# Patient Record
Sex: FEMALE | Race: BLACK OR AFRICAN AMERICAN | Employment: UNEMPLOYED | URBAN - METROPOLITAN AREA
[De-identification: names, ages, dates, MRNs, and addresses within clinical notes are randomized per-mention and may not be internally consistent; named-entity substitution may affect disease eponyms.]

---

## 2017-07-12 PROBLEM — G44.209 TENSION TYPE HEADACHE: Status: ACTIVE | Noted: 2017-07-12

## 2019-01-31 ENCOUNTER — TELEPHONE (OUTPATIENT)
Dept: INTERNAL MEDICINE | Age: 29
End: 2019-01-31

## 2019-02-07 ENCOUNTER — TELEPHONE (OUTPATIENT)
Dept: ADMINISTRATIVE | Age: 29
End: 2019-02-07

## 2019-03-04 ENCOUNTER — TELEPHONE (OUTPATIENT)
Dept: INTERNAL MEDICINE | Age: 29
End: 2019-03-04

## 2019-03-11 ENCOUNTER — OFFICE VISIT (OUTPATIENT)
Dept: INTERNAL MEDICINE | Age: 29
End: 2019-03-11
Payer: COMMERCIAL

## 2019-03-11 VITALS
DIASTOLIC BLOOD PRESSURE: 83 MMHG | TEMPERATURE: 98.3 F | HEART RATE: 118 BPM | BODY MASS INDEX: 35.03 KG/M2 | RESPIRATION RATE: 16 BRPM | SYSTOLIC BLOOD PRESSURE: 143 MMHG | WEIGHT: 218 LBS | HEIGHT: 66 IN

## 2019-03-11 DIAGNOSIS — G56.03 CARPAL TUNNEL SYNDROME, BILATERAL UPPER LIMBS: Primary | ICD-10-CM

## 2019-03-11 DIAGNOSIS — I10 ESSENTIAL HYPERTENSION: ICD-10-CM

## 2019-03-11 DIAGNOSIS — E66.9 OBESITY (BMI 35.0-39.9 WITHOUT COMORBIDITY): ICD-10-CM

## 2019-03-11 PROCEDURE — G8484 FLU IMMUNIZE NO ADMIN: HCPCS | Performed by: INTERNAL MEDICINE

## 2019-03-11 PROCEDURE — 1036F TOBACCO NON-USER: CPT | Performed by: INTERNAL MEDICINE

## 2019-03-11 PROCEDURE — 99214 OFFICE O/P EST MOD 30 MIN: CPT | Performed by: INTERNAL MEDICINE

## 2019-03-11 PROCEDURE — G8417 CALC BMI ABV UP PARAM F/U: HCPCS | Performed by: INTERNAL MEDICINE

## 2019-03-11 PROCEDURE — G8427 DOCREV CUR MEDS BY ELIG CLIN: HCPCS | Performed by: INTERNAL MEDICINE

## 2019-03-11 PROCEDURE — 99212 OFFICE O/P EST SF 10 MIN: CPT | Performed by: INTERNAL MEDICINE

## 2019-03-11 RX ORDER — AMLODIPINE BESYLATE 5 MG/1
5 TABLET ORAL DAILY
Qty: 30 TABLET | Refills: 3 | Status: SHIPPED | OUTPATIENT
Start: 2019-03-11 | End: 2019-05-21 | Stop reason: SDUPTHER

## 2019-03-11 ASSESSMENT — PATIENT HEALTH QUESTIONNAIRE - PHQ9
SUM OF ALL RESPONSES TO PHQ QUESTIONS 1-9: 0
SUM OF ALL RESPONSES TO PHQ QUESTIONS 1-9: 0
SUM OF ALL RESPONSES TO PHQ9 QUESTIONS 1 & 2: 0
1. LITTLE INTEREST OR PLEASURE IN DOING THINGS: 0
2. FEELING DOWN, DEPRESSED OR HOPELESS: 0

## 2019-04-09 ENCOUNTER — HOSPITAL ENCOUNTER (OUTPATIENT)
Age: 29
Discharge: HOME | End: 2019-04-09
Payer: COMMERCIAL

## 2019-04-09 DIAGNOSIS — G56.03 CARPAL TUNNEL SYNDROME, BILATERAL UPPER LIMBS: ICD-10-CM

## 2019-04-09 DIAGNOSIS — I10 ESSENTIAL HYPERTENSION: ICD-10-CM

## 2019-04-09 LAB
ALBUMIN SERPL-MCNC: 4 G/DL (ref 3.5–5.2)
ALP BLD-CCNC: 63 U/L (ref 35–104)
ALT SERPL-CCNC: 10 U/L (ref 0–32)
ANION GAP SERPL CALCULATED.3IONS-SCNC: 10 MMOL/L (ref 7–16)
AST SERPL-CCNC: 16 U/L (ref 0–31)
BASOPHILS ABSOLUTE: 0.03 E9/L (ref 0–0.2)
BASOPHILS RELATIVE PERCENT: 0.4 % (ref 0–2)
BILIRUB SERPL-MCNC: 0.4 MG/DL (ref 0–1.2)
BUN BLDV-MCNC: 6 MG/DL (ref 6–20)
CALCIUM SERPL-MCNC: 8.7 MG/DL (ref 8.6–10.2)
CHLORIDE BLD-SCNC: 106 MMOL/L (ref 98–107)
CHOLESTEROL, TOTAL: 153 MG/DL (ref 0–199)
CO2: 24 MMOL/L (ref 22–29)
CREAT SERPL-MCNC: 0.7 MG/DL (ref 0.5–1)
EOSINOPHILS ABSOLUTE: 0.09 E9/L (ref 0.05–0.5)
EOSINOPHILS RELATIVE PERCENT: 1.2 % (ref 0–6)
GFR AFRICAN AMERICAN: >60
GFR NON-AFRICAN AMERICAN: >60 ML/MIN/1.73
GLUCOSE BLD-MCNC: 86 MG/DL (ref 74–99)
HBA1C MFR BLD: 5.2 % (ref 4–5.6)
HCT VFR BLD CALC: 34.8 % (ref 34–48)
HDLC SERPL-MCNC: 40 MG/DL
HEMOGLOBIN: 11.2 G/DL (ref 11.5–15.5)
IMMATURE GRANULOCYTES #: 0.02 E9/L
IMMATURE GRANULOCYTES %: 0.3 % (ref 0–5)
LDL CHOLESTEROL CALCULATED: 97 MG/DL (ref 0–99)
LYMPHOCYTES ABSOLUTE: 3.31 E9/L (ref 1.5–4)
LYMPHOCYTES RELATIVE PERCENT: 44.9 % (ref 20–42)
MCH RBC QN AUTO: 27.2 PG (ref 26–35)
MCHC RBC AUTO-ENTMCNC: 32.2 % (ref 32–34.5)
MCV RBC AUTO: 84.5 FL (ref 80–99.9)
MONOCYTES ABSOLUTE: 0.51 E9/L (ref 0.1–0.95)
MONOCYTES RELATIVE PERCENT: 6.9 % (ref 2–12)
NEUTROPHILS ABSOLUTE: 3.42 E9/L (ref 1.8–7.3)
NEUTROPHILS RELATIVE PERCENT: 46.3 % (ref 43–80)
PDW BLD-RTO: 13.1 FL (ref 11.5–15)
PLATELET # BLD: 292 E9/L (ref 130–450)
PMV BLD AUTO: 12.5 FL (ref 7–12)
POTASSIUM SERPL-SCNC: 3.8 MMOL/L (ref 3.5–5)
RBC # BLD: 4.12 E12/L (ref 3.5–5.5)
SODIUM BLD-SCNC: 140 MMOL/L (ref 132–146)
TOTAL PROTEIN: 8.1 G/DL (ref 6.4–8.3)
TRIGL SERPL-MCNC: 79 MG/DL (ref 0–149)
TSH SERPL DL<=0.05 MIU/L-ACNC: 1.18 UIU/ML (ref 0.27–4.2)
VLDLC SERPL CALC-MCNC: 16 MG/DL
WBC # BLD: 7.4 E9/L (ref 4.5–11.5)

## 2019-04-09 PROCEDURE — 36415 COLL VENOUS BLD VENIPUNCTURE: CPT

## 2019-04-09 PROCEDURE — 84443 ASSAY THYROID STIM HORMONE: CPT

## 2019-04-09 PROCEDURE — 85025 COMPLETE CBC W/AUTO DIFF WBC: CPT

## 2019-04-09 PROCEDURE — 80061 LIPID PANEL: CPT

## 2019-04-09 PROCEDURE — 80053 COMPREHEN METABOLIC PANEL: CPT

## 2019-04-09 PROCEDURE — 83036 HEMOGLOBIN GLYCOSYLATED A1C: CPT

## 2019-04-15 ENCOUNTER — OFFICE VISIT (OUTPATIENT)
Dept: INTERNAL MEDICINE | Age: 29
End: 2019-04-15
Payer: COMMERCIAL

## 2019-04-15 VITALS
RESPIRATION RATE: 18 BRPM | SYSTOLIC BLOOD PRESSURE: 144 MMHG | HEIGHT: 66 IN | WEIGHT: 221.6 LBS | TEMPERATURE: 100.3 F | BODY MASS INDEX: 35.62 KG/M2 | DIASTOLIC BLOOD PRESSURE: 86 MMHG | HEART RATE: 116 BPM

## 2019-04-15 DIAGNOSIS — I10 HYPERTENSION, UNSPECIFIED TYPE: Primary | ICD-10-CM

## 2019-04-15 DIAGNOSIS — G47.30 SLEEP APNEA, UNSPECIFIED TYPE: ICD-10-CM

## 2019-04-15 DIAGNOSIS — D50.9 MICROCYTIC ANEMIA: ICD-10-CM

## 2019-04-15 PROCEDURE — 99213 OFFICE O/P EST LOW 20 MIN: CPT | Performed by: INTERNAL MEDICINE

## 2019-04-15 PROCEDURE — 1036F TOBACCO NON-USER: CPT | Performed by: INTERNAL MEDICINE

## 2019-04-15 PROCEDURE — G8427 DOCREV CUR MEDS BY ELIG CLIN: HCPCS | Performed by: INTERNAL MEDICINE

## 2019-04-15 PROCEDURE — G8417 CALC BMI ABV UP PARAM F/U: HCPCS | Performed by: INTERNAL MEDICINE

## 2019-04-15 PROCEDURE — 99212 OFFICE O/P EST SF 10 MIN: CPT | Performed by: INTERNAL MEDICINE

## 2019-04-15 RX ORDER — DEXAMETHASONE 1 MG
1 TABLET ORAL ONCE
Qty: 1 TABLET | Refills: 0 | Status: SHIPPED | OUTPATIENT
Start: 2019-04-15 | End: 2019-04-15

## 2019-04-15 NOTE — PROGRESS NOTES
[facility]  [facility]    Attending Physician Statement:    I have discussed the case, including pertinent history and exam findings with the resident. I agree with the assessment, plan and orders as documented by the resident. Patient here for routine follow up of medical problems. HTN - still high - but improving with recent start of norvasc - needs secondary work up given age. Remainder of medical problems as per resident note. Verna MATHEW.

## 2019-04-15 NOTE — PROGRESS NOTES
Repeat bp completed and dr informed of reading   Printed lab scripts given to pt by dr Daily Alejandro   Pt instructed to stop at  to schedule her fu appt and  her printed avs   Sleep study forms faxed

## 2019-04-15 NOTE — PROGRESS NOTES
[facility]  Internal Medicine Residency Program  [facility] Note      SUBJECTIVE:  CC: had concerns including Hypertension (1 month follow-up) and Results. Salima Bond presented to the [facility] for follow up. She was started on Norvasc last visit. Her BP is slightly elevated today. She states she takes her BP in afternoon. Denied any headache or chest pain or SOB. She was followed for atypical headache/neck pain. Had extensive work up in the past and was evaluated by neurology. Had an MRI of brain which was unremarkable. No evidence of MS   Still have neck pain but improved. Today she has a low fever,deneid any respiratory symptoms. Will rule out secondary causes of HTN and work up the anaemia  No new complains    STOP-BANG  Snore No   Tired, fatigued during the day Yes   Observed apnea  No   High blood pressure  Yes   BMI > 35 Yes   Age > 50 No   Neck circumference > 16in No   Gender Male  No   Total  3   Low risk 0-2  Intermediate risk 3-4  High risk 5-8      Noted low hg ,denied any heavy menstrual bleeding or easy bleeding or brusing. No fatigue or SOB or other sym toms of anaemia    Review Of Systems:  General: no fevers, chills, weight loss or gain.    Ears/Nose/Throat: no hearing loss, tinnitus, vertigo, nosebleed, nasal congestion, rhinorrhea, sore throat  Respiratory: no cough, pleuritic chest pain, dyspnea, or wheezing  Cardiovascular: no chest pain, angina, dyspnea on exertion, orthopnea, PND, palpitations, or claudication  Gastrointestinal: no nausea, vomiting, heartburn, diarrhea, constipation, abdominal pain, hematochezia or melena  Genitourinary: no urinary urgency, frequency, dysuria, nocturia, hesitancy, or incontinence  Musculoskeletal: no arthritis, arthralgia, myalgia, weakness, or morning stiffness  Skin: no abnormal pigmentation, rash, itching, masses, hair or nail changes    Current Outpatient Medications on File Prior to Visit   Medication Sig Dispense Refill  amLODIPine (NORVASC) 5 MG tablet Take 1 tablet by mouth daily 30 tablet 3     No current facility-administered medications on file prior to visit. OBJECTIVE:    VS: BP (!) 144/86 (Site: Right Upper Arm, Position: Sitting, Cuff Size: Medium Adult)   Pulse 116   Temp 100.3 °F (37.9 °C) (Oral)   Resp 18   Ht 5' 6\" (1.676 m)   Wt 221 lb 9.6 oz (100.5 kg)   BMI 35.77 kg/m²   General appearance: Alert, Awake, Oriented times 3, no distress  Lungs: Lungs clear to auscultation bilaterally. No rhonchi, crackles or wheezes  Heart: S1 S2  Regular rate and rhythm. No rub, murmur or gallop  Abdomen: Abdomen soft  non-tender. non-distended BS normal. No masses, organomegaly, no guarding rebound or rigidity. Extremities: No edema,     ASSESSMENT/PLAN:  Adina Caballero was seen today for hypertension and results. Diagnoses and all orders for this visit:    Hypertension, unspecified type  -     Cancel: Urine Drug Screen; Future  -     ALDOSTERONE & RENIN, DIRECT WITH RATIO; Future  -     Metanephrines Plasma Free; Future  -     dexamethasone (DECADRON) 1 MG tablet; Take 1 tablet by mouth once for 1 dose Take 1 mg between 11 PM and midnight the night before your blood work. -     Cortisol AM, Total; Future  -     POLYSOMNOGRAM (SLEEP STUDY); Future  -     URINE DRUG SCREEN; Future    Microcytic anemia  -     Vitamin B12 & Folate; Future  -     Iron and TIBC; Future  -     Path Review, Smear; Future  -     Ferritin; Future  -     Retic Count; Future    Sleep apnea, unspecified type  -     POLYSOMNOGRAM (SLEEP STUDY);  Future        RTC:  Secondary HTN work up   Anaemia work up  Return in 5 weeks    I have reviewed my findings and recommendations with [place] [facility] and Dr Santi Trejo MD PGY-3  4/15/2019 1:15 PM

## 2019-04-15 NOTE — PATIENT INSTRUCTIONS
Have your blood work done  . Take dexamethasone pill between 11 pm and midnight then have your blood work done at 8 am next morning  Saranya Narayanan

## 2019-05-14 ENCOUNTER — TELEPHONE (OUTPATIENT)
Dept: INTERNAL MEDICINE | Age: 29
End: 2019-05-14

## 2019-05-15 ENCOUNTER — HOSPITAL ENCOUNTER (OUTPATIENT)
Age: 29
Discharge: HOME | End: 2019-05-15
Payer: COMMERCIAL

## 2019-05-15 DIAGNOSIS — D50.9 MICROCYTIC ANEMIA: ICD-10-CM

## 2019-05-15 DIAGNOSIS — I10 HYPERTENSION, UNSPECIFIED TYPE: ICD-10-CM

## 2019-05-15 LAB
AMPHETAMINE SCREEN, URINE: NOT DETECTED
BARBITURATE SCREEN URINE: NOT DETECTED
BENZODIAZEPINE SCREEN, URINE: NOT DETECTED
CANNABINOID SCREEN URINE: NOT DETECTED
COCAINE METABOLITE SCREEN URINE: NOT DETECTED
CORTISOL TOTAL: 0.65 MCG/DL (ref 2.68–18.4)
FERRITIN: 27 NG/ML
FOLATE: 16.8 NG/ML (ref 4.8–24.2)
HCT VFR BLD CALC: 35.5 % (ref 34–48)
IMMATURE RETIC FRACT: 6.6 % (ref 3–15.9)
IRON SATURATION: 17 % (ref 15–50)
IRON: 61 MCG/DL (ref 37–145)
METHADONE SCREEN, URINE: NOT DETECTED
OPIATE SCREEN URINE: NOT DETECTED
PATHOLOGIST REVIEW: NORMAL
PHENCYCLIDINE SCREEN URINE: NOT DETECTED
PROPOXYPHENE SCREEN: NOT DETECTED
RETIC HGB EQUIVALENT: 31.2 PG (ref 28.2–36.6)
RETICULOCYTE ABSOLUTE COUNT: 0.06 E12/L
RETICULOCYTE COUNT PCT: 1.5 % (ref 0.4–1.9)
TOTAL IRON BINDING CAPACITY: 359 MCG/DL (ref 250–450)
VITAMIN B-12: 590 PG/ML (ref 211–946)

## 2019-05-15 PROCEDURE — 80307 DRUG TEST PRSMV CHEM ANLYZR: CPT

## 2019-05-15 PROCEDURE — 83550 IRON BINDING TEST: CPT

## 2019-05-15 PROCEDURE — 82607 VITAMIN B-12: CPT

## 2019-05-15 PROCEDURE — 84244 ASSAY OF RENIN: CPT

## 2019-05-15 PROCEDURE — 82088 ASSAY OF ALDOSTERONE: CPT

## 2019-05-15 PROCEDURE — 82728 ASSAY OF FERRITIN: CPT

## 2019-05-15 PROCEDURE — 36415 COLL VENOUS BLD VENIPUNCTURE: CPT

## 2019-05-15 PROCEDURE — 82746 ASSAY OF FOLIC ACID SERUM: CPT

## 2019-05-15 PROCEDURE — 85045 AUTOMATED RETICULOCYTE COUNT: CPT

## 2019-05-15 PROCEDURE — 83540 ASSAY OF IRON: CPT

## 2019-05-15 PROCEDURE — 83835 ASSAY OF METANEPHRINES: CPT

## 2019-05-15 PROCEDURE — 82533 TOTAL CORTISOL: CPT

## 2019-05-19 LAB
METANEPH/PLASMA INTERP: NORMAL
METANEPHRINE FREE PLASMA: 0.15 NMOL/L (ref 0–0.49)
NORMETANEPHRINE FREE PLASMA: 0.48 NMOL/L (ref 0–0.89)

## 2019-05-21 ENCOUNTER — OFFICE VISIT (OUTPATIENT)
Dept: INTERNAL MEDICINE | Age: 29
End: 2019-05-21
Payer: COMMERCIAL

## 2019-05-21 VITALS
HEART RATE: 115 BPM | BODY MASS INDEX: 30.34 KG/M2 | TEMPERATURE: 98.6 F | WEIGHT: 224 LBS | DIASTOLIC BLOOD PRESSURE: 82 MMHG | SYSTOLIC BLOOD PRESSURE: 138 MMHG | RESPIRATION RATE: 16 BRPM | HEIGHT: 72 IN

## 2019-05-21 DIAGNOSIS — G44.209 TENSION-TYPE HEADACHE, NOT INTRACTABLE, UNSPECIFIED CHRONICITY PATTERN: Primary | ICD-10-CM

## 2019-05-21 DIAGNOSIS — D50.9 IRON DEFICIENCY ANEMIA, UNSPECIFIED IRON DEFICIENCY ANEMIA TYPE: ICD-10-CM

## 2019-05-21 DIAGNOSIS — I10 ESSENTIAL HYPERTENSION: ICD-10-CM

## 2019-05-21 PROCEDURE — 1036F TOBACCO NON-USER: CPT | Performed by: INTERNAL MEDICINE

## 2019-05-21 PROCEDURE — G8417 CALC BMI ABV UP PARAM F/U: HCPCS | Performed by: INTERNAL MEDICINE

## 2019-05-21 PROCEDURE — 99212 OFFICE O/P EST SF 10 MIN: CPT | Performed by: INTERNAL MEDICINE

## 2019-05-21 PROCEDURE — 99213 OFFICE O/P EST LOW 20 MIN: CPT | Performed by: INTERNAL MEDICINE

## 2019-05-21 PROCEDURE — G8427 DOCREV CUR MEDS BY ELIG CLIN: HCPCS | Performed by: INTERNAL MEDICINE

## 2019-05-21 RX ORDER — LANOLIN ALCOHOL/MO/W.PET/CERES
325 CREAM (GRAM) TOPICAL
Qty: 30 TABLET | Refills: 1 | Status: SHIPPED | OUTPATIENT
Start: 2019-05-21

## 2019-05-21 RX ORDER — AMLODIPINE BESYLATE 5 MG/1
5 TABLET ORAL DAILY
Qty: 30 TABLET | Refills: 3 | Status: SHIPPED | OUTPATIENT
Start: 2019-05-21

## 2019-05-21 NOTE — PROGRESS NOTES
[facility]  Internal Medicine Residency Program  [facility] Note      SUBJECTIVE:  CC: had concerns including 1 Month Follow-Up. Shalom Atkinson presented to the [facility] for 5 weeks follow up. She has Hx of HTN and atypical headache/neck pain. She underwent secondary HTN work up and everything was wnl. Ref. Range 4/9/2019 13:38 5/15/2019 11:47 5/15/2019 11:53   Sodium Latest Ref Range: 132 - 146 mmol/L 140     Potassium Latest Ref Range: 3.5 - 5.0 mmol/L 3.8     Chloride Latest Ref Range: 98 - 107 mmol/L 106     CO2 Latest Ref Range: 22 - 29 mmol/L 24     BUN Latest Ref Range: 6 - 20 mg/dL 6     Creatinine Latest Ref Range: 0.5 - 1.0 mg/dL 0.7     Anion Gap Latest Ref Range: 7 - 16 mmol/L 10     GFR Non- Latest Ref Range: >=60 mL/min/1.73 >60     GFR  Unknown >60     Glucose Latest Ref Range: 74 - 99 mg/dL 86     Calcium Latest Ref Range: 8.6 - 10.2 mg/dL 8.7     Total Protein Latest Ref Range: 6.4 - 8.3 g/dL 8.1          Ref. Range 4/9/2019 13:38 5/15/2019 11:47 5/15/2019 11:53   Cholesterol, Total Latest Ref Range: 0 - 199 mg/dL 153     HDL Cholesterol Latest Ref Range: >40 mg/dL 40     LDL Calculated Latest Ref Range: 0 - 99 mg/dL 97     Triglycerides Latest Ref Range: 0 - 149 mg/dL 79     VLDL Cholesterol Calculated Latest Units: mg/dL 16       Results for Sherie Lesch (MRN [de-identified]) as of 5/21/2019 10:18   Ref. Range 4/9/2019 13:38 5/15/2019 11:47 5/15/2019 11:53   Glucose Latest Ref Range: 74 - 99 mg/dL 86     Hemoglobin A1C Latest Ref Range: 4.0 - 5.6 % 5.2          Ref. Range 5/15/2019 11:47   Cortisol Latest Ref Range: 2.68 - 18.40 mcg/dL 0.65 (L)   Metaneph/Plasma Interp Unknown See Note     Results for Sherie Lesch (MRN [de-identified]) as of 5/21/2019 10:18   Ref.  Range 5/15/2019 11:53   Amphetamine Screen, Urine Latest Ref Range: Negative <1000 ng/mL  NOT DETECTED   Barbiturate Screen, Ur Latest Ref Range: Negative < 200 ng/mL  NOT DETECTED   Benzodiazepine Screen, Urine Latest Ref Range: Negative < 200 ng/mL  NOT DETECTED   Cannabinoid Scrn, Ur Latest Ref Range: Negative < 50ng/mL  NOT DETECTED   Methadone Screen, Urine Latest Ref Range: Negative <300 ng/mL  NOT DETECTED   Opiate Scrn, Ur Latest Ref Range: Negative < 300ng/mL  NOT DETECTED   PCP Screen, Urine Latest Ref Range: Negative < 25 ng/mL  NOT DETECTED   Propoxyphene Scrn, Ur Latest Ref Range: Negative <300 ng/mL  NOT DETECTED   Cocaine Metabolite Screen, Urine Latest Ref Range: Negative < 300 ng/mL  NOT DETECTED     She did not do sleep study and not interested any more. Iron studies consistent with GILBERT. Review Of Systems:  General: no fevers, chills, weight loss or gain. Ears/Nose/Throat: no hearing loss, tinnitus, vertigo, nosebleed, nasal congestion, rhinorrhea, sore throat  Respiratory: no cough, pleuritic chest pain, dyspnea, or wheezing  Cardiovascular: no chest pain, angina, dyspnea on exertion, orthopnea, PND, palpitations, or claudication  Gastrointestinal: no nausea, vomiting, heartburn, diarrhea, constipation, abdominal pain, hematochezia or melena  Genitourinary: no urinary urgency, frequency, dysuria, nocturia, hesitancy, or incontinence  Musculoskeletal: no arthritis, arthralgia, myalgia, weakness, or morning stiffness  Skin: no abnormal pigmentation, rash, itching, masses, hair or nail changes    No current outpatient medications on file prior to visit. No current facility-administered medications on file prior to visit. OBJECTIVE:    VS: /82   Pulse 115   Temp 98.6 °F (37 °C) (Oral)   Resp 16   Ht 6' (1.829 m)   Wt 224 lb (101.6 kg)   BMI 30.38 kg/m²   General appearance: Alert, Awake, Oriented times 3, no distress  Lungs: Lungs clear to auscultation bilaterally. No rhonchi, crackles or wheezes  Heart: S1 S2  Regular rate and rhythm. No rub, murmur or gallop  Abdomen: Abdomen soft , non-tender.  non-distended BS normal. No masses, organomegaly, no guarding rebound or rigidity. Extremities: No edema,     ASSESSMENT/PLAN:  Dusty Reynolds was seen today for 1 month follow-up. Diagnoses and all orders for this visit:    Tension-type headache, not intractable, unspecified chronicity pattern    Essential hypertension  -     amLODIPine (NORVASC) 5 MG tablet; Take 1 tablet by mouth daily    Iron deficiency anemia, unspecified iron deficiency anemia type  -     ferrous sulfate 325 (65 Fe) MG EC tablet;  Take 1 tablet by mouth daily (with breakfast)        RTC:    Return in 3 months  Start taking iron supplements   Continue Norvasc     I have reviewed my findings and recommendations with Catrachito Prasad and Dr Dae Nathan MD PGY-3  5/21/2019 10:32 AM

## 2019-05-21 NOTE — PROGRESS NOTES
[facility] 89  Internal Medicine Residency Program  [facility] Note      SUBJECTIVE:  CC: had concerns including 1 Month Follow-Up. Chelsy Moise presented to the [facility] for a routine visit  ***    Review Of Systems:  General: no fevers, chills, weight loss or gain. Ears/Nose/Throat: no hearing loss, tinnitus, vertigo, nosebleed, nasal congestion, rhinorrhea, sore throat  Respiratory: no cough, pleuritic chest pain, dyspnea, or wheezing  Cardiovascular: no chest pain, angina, dyspnea on exertion, orthopnea, PND, palpitations, or claudication  Gastrointestinal: no nausea, vomiting, heartburn, diarrhea, constipation, abdominal pain, hematochezia or melena  Genitourinary: no urinary urgency, frequency, dysuria, nocturia, hesitancy, or incontinence  Musculoskeletal: no arthritis, arthralgia, myalgia, weakness, or morning stiffness  Skin: no abnormal pigmentation, rash, itching, masses, hair or nail changes    Current Outpatient Medications on File Prior to Visit   Medication Sig Dispense Refill    amLODIPine (NORVASC) 5 MG tablet Take 1 tablet by mouth daily 30 tablet 3     No current facility-administered medications on file prior to visit. OBJECTIVE:    VS: /82   Pulse 115   Temp 98.6 °F (37 °C) (Oral)   Resp 16   Ht 6' (1.829 m)   Wt 224 lb (101.6 kg)   BMI 30.38 kg/m²   General appearance: Alert, Awake, Oriented times 3, no distress  Lungs: Lungs clear to auscultation bilaterally. No rhonchi, crackles or wheezes  Heart: S1 S2  Regular rate and rhythm. No rub, murmur or gallop  Abdomen: Abdomen soft but obese, non-tender. non-distended BS normal. No masses, organomegaly, no guarding rebound or rigidity. Extremities: No edema, Peripheral pulses palpable 2/4    ASSESSMENT/PLAN:  Joycelyn Gil was seen today for 1 month follow-up.     Diagnoses and all orders for this visit:    Essential hypertension        RTC:    I have reviewed my findings and recommendations with Joycelyn Gil

## 2019-05-21 NOTE — PROGRESS NOTES
Discharge instructions reviewed with patient per Dr. Hannah Romero. Patient directed to  to  AVS and schedule follow up appt.

## 2019-05-21 NOTE — PROGRESS NOTES
Attending Physician Statement  I have discussed the case, including pertinent history and exam findings with the resident. I reviewed medical, surg, soc histories, meds and any pertinent labs. I agree with the assessment, plan and orders as documented by the resident. Patient with well controlled BP on amlodipine. Headaches have improved.

## 2019-05-22 LAB
Lab: NORMAL
REPORT: NORMAL
THIS TEST SENT TO: NORMAL

## 2019-07-16 ENCOUNTER — TELEPHONE (OUTPATIENT)
Dept: INTERNAL MEDICINE | Age: 29
End: 2019-07-16

## 2020-03-09 ENCOUNTER — TELEPHONE (OUTPATIENT)
Dept: INTERNAL MEDICINE | Age: 30
End: 2020-03-09

## 2020-03-09 NOTE — TELEPHONE ENCOUNTER
Mom called back. She didn't spell the name of medication right please call her back so she can write it down again.